# Patient Record
Sex: FEMALE | Race: WHITE | URBAN - METROPOLITAN AREA
[De-identification: names, ages, dates, MRNs, and addresses within clinical notes are randomized per-mention and may not be internally consistent; named-entity substitution may affect disease eponyms.]

---

## 2017-01-05 DIAGNOSIS — F34.1 DYSTHYMIA: Primary | ICD-10-CM

## 2017-01-05 RX ORDER — ESCITALOPRAM OXALATE 20 MG/1
TABLET ORAL
Qty: 30 TABLET | Refills: 0 | Status: CANCELLED | OUTPATIENT
Start: 2017-01-05

## 2017-01-05 NOTE — TELEPHONE ENCOUNTER
Escitalopram 20 mg tablets     Last Written Prescription Date: 12-  Last Fill Quantity: 12-, #30 refills: 0  Last Office Visit with [facility] primary care provider:  12- Aisha Kent.        Last PHQ-9 score on record=   PHQ-9 SCORE 12/9/2016   Total Score 13

## 2017-01-11 ENCOUNTER — VIRTUAL VISIT (OUTPATIENT)
Dept: FAMILY MEDICINE | Facility: CLINIC | Age: 57
End: 2017-01-11
Payer: COMMERCIAL

## 2017-01-11 DIAGNOSIS — F34.1 DYSTHYMIA: Primary | ICD-10-CM

## 2017-01-11 PROCEDURE — 98966 PH1 ASSMT&MGMT NQHP 5-10: CPT | Performed by: PHYSICIAN ASSISTANT

## 2017-01-11 RX ORDER — ESCITALOPRAM OXALATE 20 MG/1
20 TABLET ORAL DAILY
Qty: 90 TABLET | Refills: 1 | Status: SHIPPED | OUTPATIENT
Start: 2017-01-11

## 2017-01-11 NOTE — PROGRESS NOTES
"Yasmeen Muñiz is a 56 year old female who is being evaluated via a telephone visit.      The patient has been notified of following:     \"This telephone visit will be conducted via a call between you and your physician/provider. We have found that certain health care needs can be provided without the need for a physical exam.  This service lets us provide the care you need with a short phone conversation.  If a prescription is necessary we can send it directly to your pharmacy.  If lab work is needed we can place an order for that and you can then stop by our lab to have the test done at a later time.    We will bill your insurance company for this service.  Please check with your medical insurance if this type of visit is covered. You may be responsible for the cost of this type of visit if insurance coverage is denied.  The typical cost is $30 (10min), $59 (11-20min) and $85 (21-30min).  Most often these visits are shorter than 10 minutes.    If during the course of the call the physician/provider feels a telephone visit is not appropriate, you will not be charged for this service.\"       Consent has been obtained for this service by 2 care team members: yes. See the scanned image in the medical record.    Yasmeen Muñiz complains of  Recheck Medication      I have reviewed and updated the patient's Past Medical History, Social History, Family History and Medication List.    ALLERGIES  No known drug allergies    Jennifer Mattson MA   (MA signature)    Additional provider notes:       SUBJECTIVE:                                                    Yasmeen Muñiz is a 56 year old female who presents to clinic today for the following health issues:      Depression Followup    Status since last visit: Improved     See PHQ-9 for current symptoms.  Other associated symptoms: None    Complicating factors:   Significant life event:  No   Current substance abuse:  None  Anxiety or Panic symptoms:  Yes-    Patient states " "she still has range of emotions but does not feel \"the darkness\" as she previously did    PHQ-9  English PHQ-9   Any Language            Amount of exercise or physical activity: 4-5 days/week for an average of 45-60 minutes    Problems taking medications regularly: No    Medication side effects: none    Diet: regular (no restrictions)        Problem list and histories reviewed & adjusted, as indicated.  Additional history: as documented    Patient Active Problem List   Diagnosis     Allergic rhinitis     Essential hypertension, benign     Impaired fasting glucose     Mild intermittent asthma with exacerbation     CARDIOVASCULAR SCREENING; LDL GOAL LESS THAN 130     HTN, goal below 140/90     Non-seasonal allergic rhinitis     Habitual alcohol use     B12 deficiency     Anemia     Vitamin D deficiency disease     Esophageal reflux     Elevated LFTs     Pneumothorax on right     Health Care Home     MARLON (obstructive sleep apnea)     Advanced directives, counseling/discussion     Hyperlipidemia LDL goal <160     Dysthymia     Past Surgical History   Procedure Laterality Date     C nonspecific procedure       NVD     Hc removal of tonsils,<10 y/o       Hc dilation/curettage diag/ther non ob  age 31     Surgical history of -   10/2004     repair fx rt orbit floor/plate     Surgical history of - 11/2004     removal rt orbit (eye muscle was catching)       Social History   Substance Use Topics     Smoking status: Current Some Day Smoker -- 0.50 packs/day for 15 years     Types: Cigarettes     Smokeless tobacco: Never Used     Alcohol Use: No      Comment: 6 pack q week     Family History   Problem Relation Age of Onset     C.A.D. Father      angio at age 68.     Prostate Cancer Father      dx in his 40's     Allergies Sister      3 sisters with allergies     CANCER Maternal Grandmother      uterine     CANCER Paternal Grandfather      leukemia     Lipids Mother      Lipids Father      Thyroid Disease Mother      unknown " thyroid dx     C.A.D. Paternal Aunt      MI age mid 70's     Psychotic Disorder Daughter      bipolar     Family History Negative Daughter      Blood Disease Mother      pernicious anemia     CEREBROVASCULAR DISEASE Father      Alzheimer Disease Mother 57     Alzheimer Disease Maternal Uncle 60           ROS:  CONSTITUTIONAL:NEGATIVE for fever, chills, change in weight  PSYCHIATRIC: NEGATIVE for changes in mood or affect    OBJECTIVE:                                                    LMP 03/06/2012  There is no weight on file to calculate BMI.  Phone visit.   Voice is bright         ASSESSMENT/PLAN:                                                            1. Dysthymia  Stable. F/u in 6  Months.   - escitalopram (LEXAPRO) 20 MG tablet; Take 1 tablet (20 mg) by mouth daily  Dispense: 90 tablet; Refill: 1        Aisha Kent PA-C, PAScottC  [facility]    Assessment/Plan:  (F34.1) Dysthymia  (primary encounter diagnosis)  Comment:   Plan: escitalopram (LEXAPRO) 20 MG tablet                I have reviewed the note as documented above.  This accurately captures the substance of my conversation with the patient, Yasmeen Muñiz        Total time of call between patient and provider was   5 minutes

## 2017-01-18 ENCOUNTER — APPOINTMENT (OUTPATIENT)
Dept: GENERAL RADIOLOGY | Facility: CLINIC | Age: 57
End: 2017-01-18
Attending: EMERGENCY MEDICINE
Payer: COMMERCIAL

## 2017-01-18 ENCOUNTER — HOSPITAL ENCOUNTER (EMERGENCY)
Facility: CLINIC | Age: 57
Discharge: HOME | End: 2017-01-18
Attending: EMERGENCY MEDICINE | Admitting: EMERGENCY MEDICINE
Payer: COMMERCIAL

## 2017-01-18 VITALS
RESPIRATION RATE: 16 BRPM | DIASTOLIC BLOOD PRESSURE: 82 MMHG | SYSTOLIC BLOOD PRESSURE: 144 MMHG | TEMPERATURE: 97.9 F | BODY MASS INDEX: 20.19 KG/M2 | OXYGEN SATURATION: 96 % | HEIGHT: 70 IN | WEIGHT: 141 LBS

## 2017-01-18 DIAGNOSIS — S82.831A CLOSED FRACTURE OF DISTAL END OF RIGHT FIBULA, UNSPECIFIED FRACTURE MORPHOLOGY, INITIAL ENCOUNTER: ICD-10-CM

## 2017-01-18 PROCEDURE — 29515 APPLICATION SHORT LEG SPLINT: CPT | Mod: RT

## 2017-01-18 PROCEDURE — 25000132 ZZH RX MED GY IP 250 OP 250 PS 637: Performed by: EMERGENCY MEDICINE

## 2017-01-18 PROCEDURE — 73610 X-RAY EXAM OF ANKLE: CPT | Mod: RT

## 2017-01-18 PROCEDURE — 99284 EMERGENCY DEPT VISIT MOD MDM: CPT | Mod: 25

## 2017-01-18 RX ORDER — HYDROCODONE BITARTRATE AND ACETAMINOPHEN 5; 325 MG/1; MG/1
1 TABLET ORAL ONCE
Status: COMPLETED | OUTPATIENT
Start: 2017-01-18 | End: 2017-01-18

## 2017-01-18 RX ORDER — HYDROCODONE BITARTRATE AND ACETAMINOPHEN 5; 325 MG/1; MG/1
1-2 TABLET ORAL EVERY 4 HOURS PRN
Qty: 15 TABLET | Refills: 0 | Status: SHIPPED | OUTPATIENT
Start: 2017-01-18

## 2017-01-18 RX ADMIN — HYDROCODONE BITARTRATE AND ACETAMINOPHEN 1 TABLET: 5; 325 TABLET ORAL at 21:46

## 2017-01-18 ASSESSMENT — ENCOUNTER SYMPTOMS
ARTHRALGIAS: 1
NUMBNESS: 0
JOINT SWELLING: 1

## 2017-01-18 NOTE — ED AVS SNAPSHOT
Emergency Department    [address] [place] [address]    Phone:  [phone]    Fax:  [phone]                                       Yasmeen Muñiz   MRN: [id number]    Department:   Emergency Department   Date of Visit:  1/18/2017           After Visit Summary Signature Page     I have received my discharge instructions, and my questions have been answered. I have discussed any challenges I see with this plan with the nurse or doctor.    ..........................................................................................................................................  Patient/Patient Representative Signature      ..........................................................................................................................................  Patient Representative Print Name and Relationship to Patient    ..................................................               ................................................  Date                                            Time    ..........................................................................................................................................  Reviewed by Signature/Title    ...................................................              ..............................................  Date                                                            Time

## 2017-01-18 NOTE — ED AVS SNAPSHOT
Emergency Department    [address] [facility] [address]    Phone:  [phone]    Fax:  [phone]                                       Yasmeen Muñiz   MRN: [id number]    Department:   Emergency Department   Date of Visit:  1/18/2017           Patient Information     Date Of Birth          1960        Your diagnoses for this visit were:     Closed fracture of distal end of right fibula, unspecified fracture morphology, initial encounter        You were seen by TriggerAce MD.      Follow-up Information     [facility] In 3 days.    Contact information:    [address] [address]  [phone]          Follow up with  Emergency Department.    Specialty:  EMERGENCY MEDICINE    Why:  As needed, If symptoms worsen    Contact information:    [address] [facility] [id number] [phone]        Discharge Instructions         Ankle Fracture, Distal Fibula  You have a fracture, or broken bone, of the end of the fibula bone. The fibula is one of two bones that support the ankle joint.    Home care    You will be given a splint, cast, or special boot to prevent movement at the injury site. Do not put weight on a splint. It will break. Follow your healthcare provider s advice about when to begin bearing weight on a cast or boot.    Keep your leg elevated when sitting or lying down. When sleeping, place a pillow under the injured leg. When sitting, support the injured leg so it is level with your waist. This is very important during the first 48 hours.    Keep the cast or splint completely dry at all times. When bathing, protect the cast or splint with 2 large plastic bags. Place 1 bag outside of the other. Tape each bag with duct tape at the top end. Water can still leak in even when the foot is covered. So it's best to keep the cast, splint, or boot away from water. If a fiberglass cast or splint gets wet, dry it  with a hair dryer on a cool setting.    Place an ice pack over the injured area for no more than 15 to 20 minutes. Do this every 3 to 6 hours for the first 24 to 48 hours. Continue this 3 to 4 times a day as needed. To make an ice pack, put ice cubes in a plastic bag that seals at the top. Wrap the bag in a clean, thin towel or cloth. Never put ice or an ice pack directly on the skin. The ice pack can be put right on the cast or splint. As the ice melts, be careful that the cast or splint doesn t get wet.    You may use over-the-counter pain medicine to control pain, unless another pain medicine was prescribed. If you have chronic liver or kidney disease or ever had a stomach ulcer or GI bleeding, talk with your provider before using these medicines.  Follow-up care  Follow up with your healthcare provider in 1 week, or as advised. This is to be sure the bone is healing properly. If you were given a splint, it may be changed to a cast after the swelling goes down.  If X-rays were taken, you will be told of any new findings that may affect your care.  When to seek medical advice  Call your healthcare provider right away if any of these occur:    The plaster cast or splint becomes wet or soft    The fiberglass cast or splint stays wet for more than 24 hours    There is increased tightness or pain under the cast or splint    Your toes become swollen, cold, blue, numb, or tingly    The cast becomes loose    The cast has a bad smell    Sore areas develop under the cast    The cast develops cracks or breaks     7320-6588 [employer]. [address] [place] [address], [place] [address]. All rights reserved. This information is not intended as a substitute for professional medical care. Always follow your healthcare professional's instructions.      Opioid Medication Information    You have been given a prescription for an opioid (narcotic) pain medicine and/or have received a pain medicine while here in the Emergency  Department. These medicines can make you drowsy or impaired. You must not drive, operate dangerous equipment, or engage in any other dangerous activities while taking these medications. If you drive while taking these medications, you could be arrested for DUI, or driving under the influence. Do not drink any alcohol while you are taking these medications.   Opioid pain medications can cause addiction. If you have a history of chemical dependency of any type, you are at a higher risk of becoming addicted to pain medications.  Only take these prescribed medications to treat your pain when all other options have been tried. Take it for as short a time and as few doses as possible. Store your pain pills in a secure place, as they are frequently stolen and provide a dangerous opportunity for children or visitors in your house to start abusing these powerful medications. We will not replace any lost or stolen medicine.  As soon as your pain is better, you should flush all your remaining medication.   Many prescription pain medications contain Tylenol  (acetaminophen), including Vicodin , Tylenol #3 , Norco , Lortab , and Percocet .  You should not take any extra pills of Tylenol  if you are using these prescription medications or you can get very sick.  Do not ever take more than 4000 mg of acetaminophen in any 24 hour period.  All opioids tend to cause constipation. Drink plenty of water and eat foods that have a lot of fiber, such as fruits, vegetables, prune juice, apple juice and high fiber cereal.  Take a laxative if you don t move your bowels at least every other day. Miralax , Milk of Magnesia, Colace , or Senna  can be used to keep you regular.          24 Hour Appointment Hotline       To make an appointment at any [facility], call [phone] ([phone]). If you don't have a family doctor or clinic, we will help you find one. [facility] are conveniently located to serve the needs of you and  your family.             Review of your medicines      START taking        Dose / Directions Last dose taken    HYDROcodone-acetaminophen 5-325 MG per tablet   Commonly known as:  NORCO   Dose:  1-2 tablet   Quantity:  15 tablet        Take 1-2 tablets by mouth every 4 hours as needed for moderate to severe pain   Refills:  0          Our records show that you are taking the medicines listed below. If these are incorrect, please call your family doctor or clinic.        Dose / Directions Last dose taken    acetaminophen 325 MG tablet   Commonly known as:  TYLENOL   Dose:  325 mg   Quantity:  100 tablet        Take 1 tablet (325 mg) by mouth every 6 hours as needed for mild pain or fever   Refills:  0        albuterol 108 (90 BASE) MCG/ACT Inhaler   Commonly known as:  PROAIR HFA/PROVENTIL HFA/VENTOLIN HFA   Dose:  2 puff   Quantity:  2 Inhaler        Inhale 2 puffs into the lungs every 6 hours as needed for shortness of breath / dyspnea   Refills:  6        cetirizine 10 MG tablet   Commonly known as:  zyrTEC   Dose:  10 mg   Quantity:  30 tablet        Take 1 tablet by mouth every evening.   Refills:  1        * cyanocobalamin 1000 MCG/ML injection   Commonly known as:  VITAMIN B12   Dose:  1 mL   Quantity:  1 mL        Inject 1 mL (1,000 mcg) into the muscle every 30 days   Refills:  11        * cyanocobalamin 1000 MCG/ML injection   Commonly known as:  VITAMIN B12   Dose:  1 mL   Quantity:  1 mL        Inject 1 mL (1,000 mcg) into the muscle every 30 days   Refills:  12        * escitalopram 20 MG tablet   Commonly known as:  LEXAPRO   Quantity:  30 tablet        Take 1/2 tablet (10 mg) for 1-2 weeks, then increase to 1 tablet orally daily   Refills:  0        * escitalopram 20 MG tablet   Commonly known as:  LEXAPRO   Dose:  20 mg   Quantity:  90 tablet        Take 1 tablet (20 mg) by mouth daily   Refills:  1        ibuprofen 600 MG tablet   Commonly known as:  ADVIL/MOTRIN   Dose:  600 mg   Quantity:  60  tablet        Take 1 tablet (600 mg) by mouth every 6 hours as needed for other (inflammatory pain)   Refills:  0        lisinopril 10 MG tablet   Commonly known as:  PRINIVIL/ZESTRIL   Dose:  10 mg   Quantity:  90 tablet        Take 1 tablet (10 mg) by mouth daily   Refills:  3        traMADol 50 MG tablet   Commonly known as:  ULTRAM   Dose:   mg   Quantity:  20 tablet        Take 1-2 tablets ( mg) by mouth nightly as needed for pain   Refills:  0        * Notice:  This list has 4 medication(s) that are the same as other medications prescribed for you. Read the directions carefully, and ask your doctor or other care provider to review them with you.            Prescriptions were sent or printed at these locations (1 Prescription)                   Other Prescriptions                Printed at Department/Unit printer (1 of 1)         HYDROcodone-acetaminophen (NORCO) 5-325 MG per tablet                Procedures and tests performed during your visit     Ankle XR, G/E 3 views, right      Orders Needing Specimen Collection     None      Pending Results     No orders found from 1/17/2017 to 1/19/2017.            Pending Culture Results     No orders found from 1/17/2017 to 1/19/2017.       Test Results from your hospital stay           1/18/2017  8:58 PM - Interface, Radiant Ib      Narrative     XR ANKLE RT G/E 3 VW 1/18/2017 8:54 PM    HISTORY: Trauma.    COMPARISON: None.        Impression     IMPRESSION: Mildly displaced fracture of the distal tip of the right  lateral malleolus, with overlying soft tissue swelling.    QUANG OVALLE MD                Clinical Quality Measure: Blood Pressure Screening     Your blood pressure was checked while you were in the emergency department today. The last reading we obtained was  BP: 152/85 mmHg . Please read the guidelines below about what these numbers mean and what you should do about them.  If your systolic blood pressure (the top number) is less than 120 and  your diastolic blood pressure (the bottom number) is less than 80, then your blood pressure is normal. There is nothing more that you need to do about it.  If your systolic blood pressure (the top number) is 120-139 or your diastolic blood pressure (the bottom number) is 80-89, your blood pressure may be higher than it should be. You should have your blood pressure rechecked within a year by a primary care provider.  If your systolic blood pressure (the top number) is 140 or greater or your diastolic blood pressure (the bottom number) is 90 or greater, you may have high blood pressure. High blood pressure is treatable, but if left untreated over time it can put you at risk for heart attack, stroke, or kidney failure. You should have your blood pressure rechecked by a primary care provider within the next 4 weeks.  If your provider in the emergency department today gave you specific instructions to follow-up with your doctor or provider even sooner than that, you should follow that instruction and not wait for up to 4 weeks for your follow-up visit.        Thank you for choosing [facility]       Thank you for choosing [facility] for your care. Our goal is always to provide you with excellent care. Hearing back from our patients is one way we can continue to improve our services. Please take a few minutes to complete the written survey that you may receive in the mail after you visit with us. Thank you!        [employer] Information     [employer] gives you secure access to your electronic health record. If you see a primary care provider, you can also send messages to your care team and make appointments. If you have questions, please call your primary care clinic.  If you do not have a primary care provider, please call [phone] and they will assist you.        Care EveryWhere ID     This is your Care EveryWhere ID. This could be used by other organizations to access your [facility] medical records  [id number]         After Visit Summary       This is your record. Keep this with you and show to your community pharmacist(s) and doctor(s) at your next visit.

## 2017-01-18 NOTE — LETTER
EMERGENCY DEPARTMENT  [address] [facility] [address]  [phone]    2017    Yasmeen Muñiz  [address]   [place] [address]  [phone] (home)     : 1960      To Whom it may concern:    Yasmeen Muñiz was seen in our Emergency Department today, 2017.  I expect her condition to improve over the next 3 days.  She may return to work/school when improved.    Sincerely,        Ace Malagon Trigger

## 2017-01-19 NOTE — ED PROVIDER NOTES
"  History     Chief Complaint:  Traumatic ankle pain    HPI   Yasmeen Muñiz is a 56 year old female who presents with right ankle pain after slipping and inverting her foot. She complains of right lateral ankle pain and swelling. She denies any numbness, head trauma, LOC or other injuries.    Allergies:  NKDA     Medications:     Lisinopril  Albuterol  Zyrtec    Past Medical History:     Allergic rhinitis    Asthma    Sleep apnea    GERD  Vitamin D deficiency disease  B12 deficiency    HTN  Anemia    Habitual alcohol use      Past Surgical History:     Tonsillectomy    D & C  Repair fracture right orbit floor/plate x2    Social History:  Marital status:   Tobacco use: current - 0.50 packs/day    Alcohol use: positive - 6 pack/week  Patient presents to the ED alone.    Review of Systems   Musculoskeletal: Positive for joint swelling and arthralgias.   Neurological: Negative for numbness.   All other systems reviewed and are negative.        Physical Exam   First Vitals:  BP: 152/85 mmHg  Heart Rate: 86  Temp: 97.9  F (36.6  C)  Resp: 18  Height: 177.8 cm (5' 10\")  Weight: 63.957 kg (141 lb)  SpO2: 95 %      Physical Exam  General: Alert, interactive in mild distress  Head:  Scalp is atraumatic  Eyes:  The pupils are equal, round, and reactive to light    EOM's intact    No scleral icterus  ENT:      Nose:  The external nose is normal  Ears:  External ears are normal  Mouth/Throat: The oropharynx is normal    Mucus membranes are moist      Neck:  Normal range of motion.      There is no rigidity.    Trachea is in the midline         CV:  Regular rate and rhythm    No murmur   Resp:  Breath sounds are clear bilaterally    Non-labored, no retractions or accessory muscle use     MS:  Right lower extremity: swelling and ecchymosis over lateral malleolus. No tenderness over fibular head. Abrasion of right knee. 2+   DP pulse on the right.   Skin:  Warm and dry, No rash or lesions noted.  Neuro: Strength 5/5 x4.  " Sensation intact  In all 4 extremities.      GCS: 15  Psych:  Awake. Alert.  Normal affect.      Appropriate interactions.      Emergency Department Course     Imaging:  Radiology findings were communicated with the patient who voiced understanding of the findings.    Right Ankle XR, per radiology:  Mildly displaced fracture of the distal tip of the right lateral malleolus, with overlying soft tissue swelling.    Procedures:  PROCEDURE: Splint Placement.    A plaster short leg with stirrup splint was applied to the right ankle and after placement I checked and adjusted the fit to ensure proper positioning.  The patient was more comfortable with the splint in place.  Sensation and circulation are intact after splint placement.    Emergency Department Course:  Nursing notes and vitals reviewed.  I performed an exam of the patient as documented above.   The patient was placed on continuous pulse oximetry and cardiac monitoring.  The patient was sent for a right ankle X-ray while in the emergency department, findings above.   At 2110 the patient was rechecked and was updated on the results of her imaging studies.     I discussed the treatment plan with the patient. They expressed understanding of this plan and consented to discharge. They will be discharged home with instructions for care and follow up. In addition, the patient will return to the emergency department if their symptoms persist, worsen, if new symptoms arise or if there is any concern.  All questions were answered.  I personally reviewed the findings with the patient and answered all related questions prior to discharge.    Impression & Plan      Medical Decision Making:  Ms. gonzalez was seen and evaluated. Radiographs demonstrate right distal fibular fracture. A splint was placed and she was provided with norco and crutches. She will follow-up with [facility] and return if new symptoms develop. There's no signs of Ma[place]ouve fracture,  compartment syndrome, neurovascular compromise or other more concerning illness.      Diagnosis:    ICD-10-CM    1. Closed fracture of distal end of right fibula, unspecified fracture morphology, initial encounter S82.831A        Disposition:   Discharge home    Discharge Medications:  Discharge Medication List as of 1/18/2017 10:09 PM      START taking these medications    Details   HYDROcodone-acetaminophen (NORCO) 5-325 MG per tablet Take 1-2 tablets by mouth every 4 hours as needed for moderate to severe pain, Disp-15 tablet, R-0, Local Print             Scribe Disclosure:  I, Marine Rivera, am serving as a scribe at 9:05 PM on 1/18/2017 to document services personally performed by Ace Pedroza MD, based on my observations and the provider's statements to me.        Ace Pedroza MD  01/19/17 0030

## 2017-01-19 NOTE — DISCHARGE INSTRUCTIONS
Ankle Fracture, Distal Fibula  You have a fracture, or broken bone, of the end of the fibula bone. The fibula is one of two bones that support the ankle joint.    Home care    You will be given a splint, cast, or special boot to prevent movement at the injury site. Do not put weight on a splint. It will break. Follow your healthcare provider s advice about when to begin bearing weight on a cast or boot.    Keep your leg elevated when sitting or lying down. When sleeping, place a pillow under the injured leg. When sitting, support the injured leg so it is level with your waist. This is very important during the first 48 hours.    Keep the cast or splint completely dry at all times. When bathing, protect the cast or splint with 2 large plastic bags. Place 1 bag outside of the other. Tape each bag with duct tape at the top end. Water can still leak in even when the foot is covered. So it's best to keep the cast, splint, or boot away from water. If a fiberglass cast or splint gets wet, dry it with a hair dryer on a cool setting.    Place an ice pack over the injured area for no more than 15 to 20 minutes. Do this every 3 to 6 hours for the first 24 to 48 hours. Continue this 3 to 4 times a day as needed. To make an ice pack, put ice cubes in a plastic bag that seals at the top. Wrap the bag in a clean, thin towel or cloth. Never put ice or an ice pack directly on the skin. The ice pack can be put right on the cast or splint. As the ice melts, be careful that the cast or splint doesn t get wet.    You may use over-the-counter pain medicine to control pain, unless another pain medicine was prescribed. If you have chronic liver or kidney disease or ever had a stomach ulcer or GI bleeding, talk with your provider before using these medicines.  Follow-up care  Follow up with your healthcare provider in 1 week, or as advised. This is to be sure the bone is healing properly. If you were given a splint, it may be changed to a  cast after the swelling goes down.  If X-rays were taken, you will be told of any new findings that may affect your care.  When to seek medical advice  Call your healthcare provider right away if any of these occur:    The plaster cast or splint becomes wet or soft    The fiberglass cast or splint stays wet for more than 24 hours    There is increased tightness or pain under the cast or splint    Your toes become swollen, cold, blue, numb, or tingly    The cast becomes loose    The cast has a bad smell    Sore areas develop under the cast    The cast develops cracks or breaks     1816-4837 [employer]. [address] [place] [address]. All rights reserved. This information is not intended as a substitute for professional medical care. Always follow your healthcare professional's instructions.      Opioid Medication Information    You have been given a prescription for an opioid (narcotic) pain medicine and/or have received a pain medicine while here in the Emergency Department. These medicines can make you drowsy or impaired. You must not drive, operate dangerous equipment, or engage in any other dangerous activities while taking these medications. If you drive while taking these medications, you could be arrested for DUI, or driving under the influence. Do not drink any alcohol while you are taking these medications.   Opioid pain medications can cause addiction. If you have a history of chemical dependency of any type, you are at a higher risk of becoming addicted to pain medications.  Only take these prescribed medications to treat your pain when all other options have been tried. Take it for as short a time and as few doses as possible. Store your pain pills in a secure place, as they are frequently stolen and provide a dangerous opportunity for children or visitors in your house to start abusing these powerful medications. We will not replace any lost or stolen medicine.  As soon as your  pain is better, you should flush all your remaining medication.   Many prescription pain medications contain Tylenol  (acetaminophen), including Vicodin , Tylenol #3 , Norco , Lortab , and Percocet .  You should not take any extra pills of Tylenol  if you are using these prescription medications or you can get very sick.  Do not ever take more than 4000 mg of acetaminophen in any 24 hour period.  All opioids tend to cause constipation. Drink plenty of water and eat foods that have a lot of fiber, such as fruits, vegetables, prune juice, apple juice and high fiber cereal.  Take a laxative if you don t move your bowels at least every other day. Miralax , Milk of Magnesia, Colace , or Senna  can be used to keep you regular.

## 2017-01-23 ENCOUNTER — TRANSFERRED RECORDS (OUTPATIENT)
Dept: HEALTH INFORMATION MANAGEMENT | Facility: CLINIC | Age: 57
End: 2017-01-23

## 2017-02-01 ENCOUNTER — TELEPHONE (OUTPATIENT)
Dept: FAMILY MEDICINE | Facility: CLINIC | Age: 57
End: 2017-02-01

## 2017-02-01 NOTE — Clinical Note
[facility]  [address] [place] [id number]  [phone]  February 8, 2017    Yasmeen Muñiz  [address] [address]    Dear Yasmeen,    I care about your health and have reviewed your health plan. I have reviewed your medical conditions, medication list, and lab results and am making recommendations based on this review, to better manage your health.    You are in particular need of attention regarding:  -Breast Cancer Screening  -Colon Cancer Screening    I am recommending that you:  {recommendations:-schedule a MAMMOGRAM which is due. We have mammogram available at our clinic; Tuesday 8:00am-11:30am or Wednesday 2:00pm-4:15pm- to schedule call [phone].   Please disregard this reminder if you have had this exam elsewhere within the last year.  It would be helpful for us to have a copy of your mammogram report in our file so that we can best coordinate your care.  -schedule a COLONOSCOPY to look for colon cancer (due every 10 years or 5 years in higher risk situations.)   Colon cancer is now the second leading cause of death in the [place] for both men and women and there are over 130,000 new cases and 50,000 deaths per year from colon cancer.  Colonoscopies can prevent 90-95% of these deaths.  Problem lesions can be removed before they ever become cancer.  This test is not only looking for cancer, but also getting rid of precancerious lesions.  If you do not wish to do a colonoscopy or cannot afford to do one, at this time, there is another option. It is called a FIT test or Fecal Immunochemical Occult Blood Test (take home stool sample kit).  It does not replace the colonoscopy for colorectal cancer screening, but it can detect hidden bleeding in the lower colon.  It does need to be repeated every year and if a positive result is obtained, you would be referred for a colonoscopy.  If you have completed either one of  these tests at another facility, please have the records sent to our clinic so that we can best coordinate your care.    Here is a list of Health Maintenance topics that are due now or due soon:  Health Maintenance Due   Topic Date Due     FIT Q1 YR (NO INBASKET)  10/21/1969     MAMMO SCREEN Q2 YR (SYSTEM ASSIGNED)  12/04/2009     ASTHMA CONTROL TEST Q6 MOS (NO INBASKET)  10/22/2016       Please call us at [phone] (or use [employer]) to address the above recommendations.     Thank you for trusting [facility] and we appreciate the opportunity to serve you.  We look forward to supporting your healthcare needs in the future.    Healthy Regards,    Aisha Kent PA-C

## 2017-02-01 NOTE — TELEPHONE ENCOUNTER
Panel Management Review      Patient has the following on her problem list:     Asthma review     ACT Total Scores 4/22/2016   ACT TOTAL SCORE -   ASTHMA ER VISITS -   ASTHMA HOSPITALIZATIONS -   ACT TOTAL SCORE (Goal Greater than or Equal to 20) 15   In the past 12 months, how many times did you visit the emergency room for your asthma without being admitted to the hospital? 0   In the past 12 months, how many times were you hospitalized overnight because of your asthma? 0      1. Is Asthma diagnosis on the Problem List? Yes    2. Is Asthma listed on Health Maintenance? Yes    3. Patient is due for:  ACT      Composite cancer screening  Chart review shows that this patient is due/due soon for the following Mammogram and Fecal Colorectal (FIT)  Summary:    Patient is due/failing the following:   ACT, FIT and MAMMOGRAM    Action needed:   Patient needs referral/order: Mammo  and  fit test at lab    Type of outreach:    Phone, left message for patient to call back.     Questions for provider review:    None                                                                                                                                    Jennifer Mattson MA       Chart routed to Care Team .

## 2017-02-27 ENCOUNTER — TELEPHONE (OUTPATIENT)
Dept: FAMILY MEDICINE | Facility: CLINIC | Age: 57
End: 2017-02-27

## 2017-02-27 DIAGNOSIS — M25.512 ACUTE PAIN OF LEFT SHOULDER: ICD-10-CM

## 2017-02-27 RX ORDER — TRAMADOL HYDROCHLORIDE 50 MG/1
50-100 TABLET ORAL
Qty: 20 TABLET | Refills: 0 | Status: CANCELLED | OUTPATIENT
Start: 2017-02-27

## 2017-02-27 NOTE — TELEPHONE ENCOUNTER
Controlled Substance Refill Request for Tramadol 50mg  Problem List Complete:  No     PROVIDER TO CONSIDER COMPLETION OF PROBLEM LIST AND OVERVIEW/CONTROLLED SUBSTANCE AGREEMENT    Last Written Prescription Date:  12/9/2016  Last Fill Quantity: 20,   # refills: 0    Last Office Visit with [facility] primary care provider: 12/9/2016-Aisha Kent    Future Office visit:     Controlled substance agreement on file: No.     Processing:  Fax Rx to [facility] pharmacy   checked in past 6 months?  No, route to RN

## 2017-02-28 NOTE — TELEPHONE ENCOUNTER
Denied tramadol refill request.   Why is pt needing this? I see she just had a fracture.    I believe she was seeing ortho, if Rx if due to that pain, may want to contact them    Aisha Kent PA-C

## 2017-03-01 NOTE — TELEPHONE ENCOUNTER
"Pt states she takes for pain in neck.  Informed Rx was not approved. Replied, \"Whatever.\"   Zaid Sanchez RN    "

## 2017-03-06 DIAGNOSIS — M25.512 ACUTE PAIN OF LEFT SHOULDER: ICD-10-CM

## 2017-03-06 RX ORDER — TRAMADOL HYDROCHLORIDE 50 MG/1
50-100 TABLET ORAL
Qty: 20 TABLET | Refills: 0 | Status: CANCELLED | OUTPATIENT
Start: 2017-03-06

## 2017-04-28 DIAGNOSIS — I10 HYPERTENSION GOAL BP (BLOOD PRESSURE) < 140/90: ICD-10-CM

## 2017-04-28 NOTE — LETTER
[address] [address]  Phone: [phone]    04/29/17    Yasmeen Muñiz  [address] [address]      Dear Yasmeen:     We recently received a call from your pharmacy requesting a refill of your medication.    A review of your chart indicates that an appointment is for lab work. Please call the clinic at [phone] to schedule your appointment.    We have authorized one refill of your medication to allow time for you to schedule your appointment.    Taking care of your health is important to us, and ongoing visits with your provider are vital to your care.  We look forward to seeing you in the near future.          Sincerely,      Aisha Kent PA-C, Kary CLEANING RN

## 2017-04-28 NOTE — TELEPHONE ENCOUNTER
lisinopril (PRINIVIL,ZESTRIL) 10 MG tablet      Last Written Prescription Date: 4/22/16  Last Fill Quantity: 90, # refills: 3  Last Office Visit with [facility], [facility] or [facility] prescribing provider: Donte 12/09/16       Potassium   Date Value Ref Range Status   04/22/2016 4.4 3.4 - 5.3 mmol/L Final     Creatinine   Date Value Ref Range Status   04/22/2016 0.54 0.52 - 1.04 mg/dL Final     BP Readings from Last 3 Encounters:   01/18/17 144/82   12/09/16 131/84   04/22/16 121/77

## 2017-04-29 RX ORDER — LISINOPRIL 10 MG/1
10 TABLET ORAL DAILY
Qty: 90 TABLET | Refills: 0 | Status: SHIPPED | OUTPATIENT
Start: 2017-04-29 | End: 2017-08-01

## 2017-04-29 NOTE — TELEPHONE ENCOUNTER
Medication is being filled for 1 time refill only due to:  Future labs ordered Letter sent to schedule labs.     Kary Brandt RN, BS  Clinical Nurse Triage.

## 2017-04-30 DIAGNOSIS — J45.21 MILD INTERMITTENT ASTHMA WITH EXACERBATION: ICD-10-CM

## 2017-04-30 NOTE — TELEPHONE ENCOUNTER
Pending Prescriptions:                       Disp   Refills    ALBUTEROL 108 (90 BASE) MCG/ACT inhaler [*25.5 g 0            Sig: INHALE 2 PUFFS BY MOUTH EVERY 6 HOURS AS NEEDED           FOR SHORTNESS OF BREATH OR DIFFICULT BREATHING             Last Written Prescription Date: 4/22/2016  Last Fill Quantity: 2 inhaler, # refills: 6    Last Office Visit with [facility], [facility] or [facility] prescribing provider:  12/9/2016Donte   Future Office Visit:       Date of Last Asthma Action Plan Letter:   Asthma Action Plan Q1 Year    Topic Date Due     Asthma Action Plan - yearly  04/22/2017      Asthma Control Test:   ACT Total Scores 4/22/2016   ACT TOTAL SCORE -   ASTHMA ER VISITS -   ASTHMA HOSPITALIZATIONS -   ACT TOTAL SCORE (Goal Greater than or Equal to 20) 15   In the past 12 months, how many times did you visit the emergency room for your asthma without being admitted to the hospital? 0   In the past 12 months, how many times were you hospitalized overnight because of your asthma? 0       Date of Last Spirometry Test:   No results found for this or any previous visit.

## 2017-05-02 RX ORDER — ALBUTEROL SULFATE 90 UG/1
AEROSOL, METERED RESPIRATORY (INHALATION)
Qty: 25.5 G | Refills: 0 | Status: SHIPPED | OUTPATIENT
Start: 2017-05-02 | End: 2017-06-26

## 2017-05-02 NOTE — TELEPHONE ENCOUNTER
Prescription approved per [facility] Refill [facility] x 1.   [employer] message sent due for asthma follow up .  Last ACT 4/22/16  Zaid Sanchez RN

## 2017-05-03 ENCOUNTER — TELEPHONE (OUTPATIENT)
Dept: FAMILY MEDICINE | Facility: CLINIC | Age: 57
End: 2017-05-03

## 2017-05-03 NOTE — TELEPHONE ENCOUNTER
5/3/2017    Call Regarding Preventive Health Screening Colonoscopy, Mammogram and Cervical/PAP    Attempt 1    Message on voicemail     Comments:       Outreach   cnt

## 2017-06-26 DIAGNOSIS — J45.21 MILD INTERMITTENT ASTHMA WITH EXACERBATION: ICD-10-CM

## 2017-06-26 NOTE — TELEPHONE ENCOUNTER
Pending Prescriptions:                       Disp   Refills    albuterol (ALBUTEROL) 108 (90 BASE) MCG/A*25.5 g 0          FYI-patient know she is due for a appointment but can't get in until August, can you send enough medication until then?  Inés Bueno     Albuterol       Last Written Prescription Date: 05/02/17  Last Fill Quantity: 1, # refills: 0    Last Office Visit with [facility] or [facility] prescribing provider:  01/11/2017  Future Office Visit:       Date of Last Asthma Action Plan Letter:   Asthma Action Plan Q1 Year    Topic Date Due     Asthma Action Plan - yearly  04/22/2017      Asthma Control Test:   ACT Total Scores 4/22/2016   ACT TOTAL SCORE -   ASTHMA ER VISITS -   ASTHMA HOSPITALIZATIONS -   ACT TOTAL SCORE (Goal Greater than or Equal to 20) 15   In the past 12 months, how many times did you visit the emergency room for your asthma without being admitted to the hospital? 0   In the past 12 months, how many times were you hospitalized overnight because of your asthma? 0       Date of Last Spirometry Test:   No results found for this or any previous visit.    Patient can be reach at [phone].  Inés Bueno

## 2017-06-27 RX ORDER — ALBUTEROL SULFATE 90 UG/1
AEROSOL, METERED RESPIRATORY (INHALATION)
Qty: 25.5 G | Refills: 0 | Status: SHIPPED | OUTPATIENT
Start: 2017-06-27

## 2017-07-20 NOTE — TELEPHONE ENCOUNTER
7/20/2017    Attempt 2    Contacted patient in regards to scheduling VIP mammogram  Message on voicemail     Patient is also due for - Preventive Health Screening Colonoscopy    Comments: will call on own time      Outreach   Stacia Soria

## 2017-08-31 ENCOUNTER — TELEPHONE (OUTPATIENT)
Dept: FAMILY MEDICINE | Facility: CLINIC | Age: 57
End: 2017-08-31

## 2017-08-31 NOTE — TELEPHONE ENCOUNTER
Panel Management Review      Patient has the following on her problem list:     Asthma review     ACT Total Scores 4/22/2016   ACT TOTAL SCORE -   ASTHMA ER VISITS -   ASTHMA HOSPITALIZATIONS -   ACT TOTAL SCORE (Goal Greater than or Equal to 20) 15   In the past 12 months, how many times did you visit the emergency room for your asthma without being admitted to the hospital? 0   In the past 12 months, how many times were you hospitalized overnight because of your asthma? 0      1. Is Asthma diagnosis on the Problem List? Yes    2. Is Asthma listed on Health Maintenance? Yes    3. Patient is due for:  ACT        Composite cancer screening  Chart review shows that this patient is due/due soon for the following Mammogram and Fecal Colorectal (FIT)  Summary:    Patient is due/failing the following:   ACT, FIT and MAMMOGRAM    Action needed:   Patient needs to do ACT. and Patient needs referral/order: Fit and Mammo    Type of outreach:    Sent letter. and Copy of ACT mailed to patient, will reach out in 5 days.    Questions for provider review:    None                                                                                                                                    Jennifer Mattson MA       Chart routed to no one .

## 2017-08-31 NOTE — LETTER
[facility]  [address] [place] [id number]  [phone]  August 31, 2017    Yasmeen Muñiz  [address] [address]    Dear Yasmeen,    I care about your health and have reviewed your health plan. I have reviewed your medical conditions, medication list, and lab results and am making recommendations based on this review, to better manage your health.    You are in particular need of attention regarding:  -Asthma  -Breast Cancer Screening  -Colon Cancer Screening    I am recommending that you:  {recommendations:-schedule a MAMMOGRAM which is due. We have mammogram available at our clinic; Tuesday 8:00am-11:30am or Wednesday 2:00pm-4:15pm- to schedule call [phone].   Please disregard this reminder if you have had this exam elsewhere within the last year.  It would be helpful for us to have a copy of your mammogram report in our file so that we can best coordinate your care.  -schedule a COLONOSCOPY to look for colon cancer (due every 10 years or 5 years in higher risk situations.)   Colon cancer is now the second leading cause of death in the [place] for both men and women and there are over 130,000 new cases and 50,000 deaths per year from colon cancer.  Colonoscopies can prevent 90-95% of these deaths.  Problem lesions can be removed before they ever become cancer.  This test is not only looking for cancer, but also getting rid of precancerious lesions.  If you do not wish to do a colonoscopy or cannot afford to do one, at this time, there is another option. It is called a FIT test or Fecal Immunochemical Occult Blood Test (take home stool sample kit).  It does not replace the colonoscopy for colorectal cancer screening, but it can detect hidden bleeding in the lower colon.  It does need to be repeated every year and if a positive result is obtained, you would be referred for a colonoscopy.  If you have completed either one  of these tests at another facility, please have the records sent to our clinic so that we can best coordinate your care.   -Complete and return the attached ASTHMA CONTROL TEST.  If your total score is 19 or less or you have been to the ER or urgent care for your asthma, then please schedule an asthma followup appointment.    Here is a list of Health Maintenance topics that are due now or due soon:  Health Maintenance Due   Topic Date Due     FIT Q1 YR  10/21/1969     DEPRESSION ACTION PLAN Q1 YR  10/21/1977     MAMMO SCREEN Q2 YR (SYSTEM ASSIGNED)  12/04/2009     ASTHMA CONTROL TEST Q6 MOS  10/22/2016     ASTHMA ACTION PLAN Q1 YR  04/22/2017     PHQ-9 Q6 MONTHS  06/09/2017       Please call us at [phone] (or use [employer]) to address the above recommendations.     Thank you for trusting [facility] and we appreciate the opportunity to serve you.  We look forward to supporting your healthcare needs in the future.    Healthy Regards,    Aisha Kent PA-C

## 2017-09-01 DIAGNOSIS — I10 HYPERTENSION GOAL BP (BLOOD PRESSURE) < 140/90: ICD-10-CM

## 2017-09-01 NOTE — TELEPHONE ENCOUNTER
Lisinopril      Last Written Prescription Date: 08/03/17  Last Fill Quantity: 30, # refills: 0  Last Office Visit with [facility] or [facility] prescribing provider: 12/09/16 Jaime       Potassium   Date Value Ref Range Status   04/22/2016 4.4 3.4 - 5.3 mmol/L Final     Creatinine   Date Value Ref Range Status   04/22/2016 0.54 0.52 - 1.04 mg/dL Final     BP Readings from Last 3 Encounters:   01/18/17 144/82   12/09/16 131/84   04/22/16 121/77

## 2017-09-05 RX ORDER — LISINOPRIL 10 MG/1
10 TABLET ORAL DAILY
Qty: 14 TABLET | Refills: 0 | Status: SHIPPED | OUTPATIENT
Start: 2017-09-05 | End: 2017-09-16

## 2017-09-05 NOTE — TELEPHONE ENCOUNTER
Routing refill request to provider for review/approval because:  Jenn given x2 and patient did not follow up, please advise     Left message on voicemail for pt to call back. Your pharmacy sent us a refill request and we need to talk with you to process it.   Aisha, could we send in a week or ? Refill?  Zaid Sanchez RN

## 2017-09-16 ENCOUNTER — TELEPHONE (OUTPATIENT)
Dept: FAMILY MEDICINE | Facility: CLINIC | Age: 57
End: 2017-09-16

## 2017-09-16 DIAGNOSIS — I10 HYPERTENSION GOAL BP (BLOOD PRESSURE) < 140/90: ICD-10-CM

## 2017-09-16 RX ORDER — LISINOPRIL 10 MG/1
TABLET ORAL
Qty: 14 TABLET | Refills: 0 | Status: SHIPPED | OUTPATIENT
Start: 2017-09-16

## 2017-09-28 DIAGNOSIS — I10 HYPERTENSION GOAL BP (BLOOD PRESSURE) < 140/90: ICD-10-CM

## 2017-09-28 NOTE — TELEPHONE ENCOUNTER
LISINOPRIL 10MG TABLETS        Last Written Prescription Date: 09/16/17  Last Fill Quantity: 14, # refills: 0  Last Office Visit with [facility], [facility] or [facility] prescribing provider: 12/09/16 Aisha Kent       Potassium   Date Value Ref Range Status   04/22/2016 4.4 3.4 - 5.3 mmol/L Final     Creatinine   Date Value Ref Range Status   04/22/2016 0.54 0.52 - 1.04 mg/dL Final     BP Readings from Last 3 Encounters:   01/18/17 144/82   12/09/16 131/84   04/22/16 121/77

## 2017-09-29 RX ORDER — LISINOPRIL 10 MG/1
TABLET ORAL
Start: 2017-09-29

## 2017-09-29 NOTE — TELEPHONE ENCOUNTER
Left detailed message your prescription refill was denied. Please call [phone] for an appointment.   Refill denied with note to pharmacist: Office visit required. Multiple calls to patient not returned. If she calls pharmacy, please inform. Thanks.  Zaid Sanchez RN

## 2017-09-29 NOTE — TELEPHONE ENCOUNTER
September 5, 2017   Aisha Kent PA-C     12:46 PM   Note      rx filled for 2 weeks then no more           Routing refill request to provider for review/approval because:  Jenn given x1 and patient did not follow up, please advise    Kary Brandt RN, BS  Clinical Nurse Triage.

## 2017-09-29 NOTE — TELEPHONE ENCOUNTER
Then no I cannot prescribe the medicine any more.  Helen Villegas MD  [facility]  [phone]

## 2017-10-06 NOTE — TELEPHONE ENCOUNTER
Patient calling and wants to know what hold up is on her refills.  Advised needs visit.  States does not know why she needs to come in.  Advised we do need to see once a year for med refills.  Asked says who.  Advised [place] policy and offered to transfer to clinic manager to discuss.  Said she will just go without and hung-up.  Amarilys Garzon RN

## 2017-12-23 DIAGNOSIS — E53.8 B12 DEFICIENCY: ICD-10-CM

## 2017-12-26 RX ORDER — CYANOCOBALAMIN 1000 UG/ML
INJECTION, SOLUTION INTRAMUSCULAR; SUBCUTANEOUS
Qty: 1 ML | Refills: 0 | OUTPATIENT
Start: 2017-12-26

## 2017-12-26 NOTE — TELEPHONE ENCOUNTER
Please call pt. Rx denied for B12.  Had lab drawn almost 2 years ago.  Also due for BP f/u and albs.     Please call.

## 2017-12-26 NOTE — TELEPHONE ENCOUNTER
Requested Prescriptions   Pending Prescriptions Disp Refills     cyanocobalamin (VITAMIN B12) 1000 MCG/ML injection [Pharmacy Med Name: CYANOCOBALAMIN 1000MCG/ML INJ, 1ML] 1 mL 0    Last Written Prescription Date:  12/9/19  Last Fill Quantity: 1mL   ,  # refills: 12   Last Office Visit with [facility] or [facility] prescribing provider:  1/11/2017   Future Office Visit:      Sig: INJECT 1 ML IN THE MUSCLE EVERY 30 DAYS    Vitamin Supplements (Adult) Protocol Failed    12/23/2017 12:07 PM       Failed - Recent or future visit with authorizing provider's specialty    Patient had office visit in the last year or has a visit in the next 30 days with authorizing provider.  See chart review.          Passed - High dose Vitamin D not ordered

## 2018-12-27 ENCOUNTER — APPOINTMENT (OUTPATIENT)
Dept: GENERAL RADIOLOGY | Facility: CLINIC | Age: 58
End: 2018-12-27
Attending: EMERGENCY MEDICINE
Payer: COMMERCIAL

## 2018-12-27 ENCOUNTER — HOSPITAL ENCOUNTER (EMERGENCY)
Facility: CLINIC | Age: 58
Discharge: HOME | End: 2018-12-27
Attending: EMERGENCY MEDICINE | Admitting: EMERGENCY MEDICINE
Payer: COMMERCIAL

## 2018-12-27 VITALS
SYSTOLIC BLOOD PRESSURE: 159 MMHG | OXYGEN SATURATION: 94 % | WEIGHT: 145 LBS | TEMPERATURE: 97.7 F | RESPIRATION RATE: 13 BRPM | HEART RATE: 80 BPM | BODY MASS INDEX: 20.76 KG/M2 | DIASTOLIC BLOOD PRESSURE: 107 MMHG | HEIGHT: 70 IN

## 2018-12-27 DIAGNOSIS — R79.89 ELEVATED LFTS: ICD-10-CM

## 2018-12-27 DIAGNOSIS — R07.89 ATYPICAL CHEST PAIN: ICD-10-CM

## 2018-12-27 DIAGNOSIS — I10 ESSENTIAL HYPERTENSION, BENIGN: ICD-10-CM

## 2018-12-27 LAB
ALBUMIN SERPL-MCNC: 4.4 G/DL (ref 3.4–5)
ALBUMIN UR-MCNC: 10 MG/DL
ALP SERPL-CCNC: 71 U/L (ref 40–150)
ALT SERPL W P-5'-P-CCNC: 70 U/L (ref 0–50)
ANION GAP SERPL CALCULATED.3IONS-SCNC: 11 MMOL/L (ref 3–14)
APPEARANCE UR: CLEAR
AST SERPL W P-5'-P-CCNC: 65 U/L (ref 0–45)
BASOPHILS # BLD AUTO: 0 10E9/L (ref 0–0.2)
BASOPHILS NFR BLD AUTO: 0.7 %
BILIRUB SERPL-MCNC: 0.5 MG/DL (ref 0.2–1.3)
BILIRUB UR QL STRIP: NEGATIVE
BUN SERPL-MCNC: 10 MG/DL (ref 7–30)
CALCIUM SERPL-MCNC: 9.5 MG/DL (ref 8.5–10.1)
CHLORIDE SERPL-SCNC: 103 MMOL/L (ref 94–109)
CO2 SERPL-SCNC: 23 MMOL/L (ref 20–32)
COLOR UR AUTO: YELLOW
CREAT SERPL-MCNC: 0.52 MG/DL (ref 0.52–1.04)
D DIMER PPP FEU-MCNC: 0.3 UG/ML FEU (ref 0–0.5)
DIFFERENTIAL METHOD BLD: NORMAL
EOSINOPHIL # BLD AUTO: 0.1 10E9/L (ref 0–0.7)
EOSINOPHIL NFR BLD AUTO: 3.1 %
ERYTHROCYTE [DISTWIDTH] IN BLOOD BY AUTOMATED COUNT: 12.9 % (ref 10–15)
GFR SERPL CREATININE-BSD FRML MDRD: >90 ML/MIN/{1.73_M2}
GLUCOSE SERPL-MCNC: 110 MG/DL (ref 70–99)
GLUCOSE UR STRIP-MCNC: NEGATIVE MG/DL
HCT VFR BLD AUTO: 38.2 % (ref 35–47)
HGB BLD-MCNC: 13.6 G/DL (ref 11.7–15.7)
HGB UR QL STRIP: NEGATIVE
HYALINE CASTS #/AREA URNS LPF: 1 /LPF (ref 0–2)
IMM GRANULOCYTES # BLD: 0 10E9/L (ref 0–0.4)
IMM GRANULOCYTES NFR BLD: 0.4 %
INTERPRETATION ECG - MUSE: NORMAL
KETONES UR STRIP-MCNC: 40 MG/DL
LEUKOCYTE ESTERASE UR QL STRIP: NEGATIVE
LIPASE SERPL-CCNC: 168 U/L (ref 73–393)
LYMPHOCYTES # BLD AUTO: 1.1 10E9/L (ref 0.8–5.3)
LYMPHOCYTES NFR BLD AUTO: 23.4 %
MCH RBC QN AUTO: 32.4 PG (ref 26.5–33)
MCHC RBC AUTO-ENTMCNC: 35.6 G/DL (ref 31.5–36.5)
MCV RBC AUTO: 91 FL (ref 78–100)
MONOCYTES # BLD AUTO: 0.3 10E9/L (ref 0–1.3)
MONOCYTES NFR BLD AUTO: 6.3 %
MUCOUS THREADS #/AREA URNS LPF: PRESENT /LPF
NEUTROPHILS # BLD AUTO: 3 10E9/L (ref 1.6–8.3)
NEUTROPHILS NFR BLD AUTO: 66.1 %
NITRATE UR QL: NEGATIVE
NRBC # BLD AUTO: 0 10*3/UL
NRBC BLD AUTO-RTO: 0 /100
PH UR STRIP: 8.5 PH (ref 5–7)
PLATELET # BLD AUTO: 189 10E9/L (ref 150–450)
POTASSIUM SERPL-SCNC: 4.1 MMOL/L (ref 3.4–5.3)
PROT SERPL-MCNC: 7.8 G/DL (ref 6.8–8.8)
RBC # BLD AUTO: 4.2 10E12/L (ref 3.8–5.2)
RBC #/AREA URNS AUTO: <1 /HPF (ref 0–2)
SODIUM SERPL-SCNC: 137 MMOL/L (ref 133–144)
SOURCE: ABNORMAL
SP GR UR STRIP: 1.01 (ref 1–1.03)
SQUAMOUS #/AREA URNS AUTO: <1 /HPF (ref 0–1)
TROPONIN I SERPL-MCNC: <0.015 UG/L (ref 0–0.04)
TROPONIN I SERPL-MCNC: <0.015 UG/L (ref 0–0.04)
TSH SERPL DL<=0.005 MIU/L-ACNC: 1.39 MU/L (ref 0.4–4)
UROBILINOGEN UR STRIP-MCNC: NORMAL MG/DL (ref 0–2)
WBC # BLD AUTO: 4.6 10E9/L (ref 4–11)
WBC #/AREA URNS AUTO: 1 /HPF (ref 0–5)

## 2018-12-27 PROCEDURE — 25000128 H RX IP 250 OP 636: Performed by: EMERGENCY MEDICINE

## 2018-12-27 PROCEDURE — 85379 FIBRIN DEGRADATION QUANT: CPT | Performed by: EMERGENCY MEDICINE

## 2018-12-27 PROCEDURE — 96361 HYDRATE IV INFUSION ADD-ON: CPT

## 2018-12-27 PROCEDURE — 83690 ASSAY OF LIPASE: CPT | Performed by: EMERGENCY MEDICINE

## 2018-12-27 PROCEDURE — 84443 ASSAY THYROID STIM HORMONE: CPT | Performed by: EMERGENCY MEDICINE

## 2018-12-27 PROCEDURE — 71046 X-RAY EXAM CHEST 2 VIEWS: CPT

## 2018-12-27 PROCEDURE — 85025 COMPLETE CBC W/AUTO DIFF WBC: CPT | Performed by: EMERGENCY MEDICINE

## 2018-12-27 PROCEDURE — 81001 URINALYSIS AUTO W/SCOPE: CPT | Performed by: EMERGENCY MEDICINE

## 2018-12-27 PROCEDURE — 80053 COMPREHEN METABOLIC PANEL: CPT | Performed by: EMERGENCY MEDICINE

## 2018-12-27 PROCEDURE — 99285 EMERGENCY DEPT VISIT HI MDM: CPT | Mod: 25

## 2018-12-27 PROCEDURE — 96374 THER/PROPH/DIAG INJ IV PUSH: CPT

## 2018-12-27 PROCEDURE — 93005 ELECTROCARDIOGRAM TRACING: CPT

## 2018-12-27 PROCEDURE — 84484 ASSAY OF TROPONIN QUANT: CPT | Performed by: EMERGENCY MEDICINE

## 2018-12-27 RX ORDER — PANTOPRAZOLE SODIUM 40 MG/1
40 TABLET, DELAYED RELEASE ORAL DAILY
Qty: 30 TABLET | Refills: 0 | Status: SHIPPED | OUTPATIENT
Start: 2018-12-27 | End: 2019-01-26

## 2018-12-27 RX ORDER — ONDANSETRON 2 MG/ML
4 INJECTION INTRAMUSCULAR; INTRAVENOUS EVERY 30 MIN PRN
Status: DISCONTINUED | OUTPATIENT
Start: 2018-12-27 | End: 2018-12-27 | Stop reason: HOSPADM

## 2018-12-27 RX ORDER — SODIUM CHLORIDE 9 MG/ML
1000 INJECTION, SOLUTION INTRAVENOUS CONTINUOUS
Status: DISCONTINUED | OUTPATIENT
Start: 2018-12-27 | End: 2018-12-27 | Stop reason: HOSPADM

## 2018-12-27 RX ADMIN — SODIUM CHLORIDE 1000 ML: 9 INJECTION, SOLUTION INTRAVENOUS at 09:53

## 2018-12-27 RX ADMIN — ONDANSETRON 4 MG: 2 INJECTION INTRAMUSCULAR; INTRAVENOUS at 09:53

## 2018-12-27 ASSESSMENT — ENCOUNTER SYMPTOMS
CONSTIPATION: 0
DYSURIA: 0
CHILLS: 1
FEVER: 0
COUGH: 0
FREQUENCY: 0
VOMITING: 0
DIARRHEA: 0
DIAPHORESIS: 1
HEADACHES: 0
SHORTNESS OF BREATH: 1
NAUSEA: 1
DIFFICULTY URINATING: 0

## 2018-12-27 ASSESSMENT — MIFFLIN-ST. JEOR: SCORE: 1312.97

## 2018-12-27 NOTE — ED NOTES
Bed: ED11  Expected date: 12/27/18  Expected time: 8:54 AM  Means of arrival: Ambulance  Comments:  [age]f sob tremors  ETA [id number]

## 2018-12-27 NOTE — ED AVS SNAPSHOT
Emergency Department  [address] [place] [address]  Phone:  [phone]  Fax:  [phone]                                    Yasmeen Muñiz   MRN: [id number]    Department:   Emergency Department   Date of Visit:  12/27/2018           After Visit Summary Signature Page    I have received my discharge instructions, and my questions have been answered. I have discussed any challenges I see with this plan with the nurse or doctor.    ..........................................................................................................................................  Patient/Patient Representative Signature      ..........................................................................................................................................  Patient Representative Print Name and Relationship to Patient    ..................................................               ................................................  Date                                   Time    ..........................................................................................................................................  Reviewed by Signature/Title    ...................................................              ..............................................  Date                                               Time          22EPIC Rev 08/18

## 2018-12-27 NOTE — ED PROVIDER NOTES
History     Chief Complaint:  Shortness of Breath    The history is provided by the patient.      Yasmeen Muñiz is a 58 year old female with a history of asthma, HTN, right sided pneumothorax who presents to the emergency department for evaluation of shortness of breath and tremors. Starting two days ago, the patient states she started to be nauseated and was not able to eat at the [email or web address] party. She then had the onset of shortness of breath even with just to talking that reminded her of her asthma. She tried her inhaler which did not help. The continuation of the shortness of breath prompted her to seek evaluation here in the emergency department.    Here, the patient continues to complain of shortness of breath with talking as well as being nauseated. She has some mild chest pain. She also has chills and hot flashes and states she is tremulous as well but no fevers. She is without leg swelling, back pain, abdominal pain, diarrhea, urinary symptoms, headaches, cough, and rashes.  The patient is a habitual alcohol drinker, with her most recent consumption being a glass of wine yesterday. She states she recently quit smoking and is still using her E cigarette periodically.    Cardiac/PE/DVT Risk Factors:  The patient has a history of hypertension, hyperlipidemia, and smoking. She reports a family history of CAD, hyperlipidemia, and HTN. The patient denies any personal or familial history of PE, DVT, or clotting disorder. The patient denies recent travel, surgery, or other immobilizations.       Allergies:  NKDA     Medications:    Albuterol  Zyrtec  Lexapro  Lisinopril    Past Medical History:    Dysthymia  Hyperlipidemia  MARLON  HTN  Asthma  Anemia  B12 deficiency  Allergic rhinitis  Elevated LFT's  Vitamin D Deficiency    Past Surgical History:    D and C   Tonsillectomy  Surgical repair of right orbit floor plate  Removal of right orbit eye muscles    Family History:    Hyperlipidemia  Thyroid  "disease  Pernicious anemia  Alzheimer's disease  CAD: Father  Prostate cancer  Bipolar disorder  Leukemia  Uterine Cancer  Cerebrovascular disease    Social History:  Current smoker: 0.50 ppd  Negative for alcohol use.  Negative for drug use.  Patient resides at home alone.  Marital Status:        Review of Systems   Constitutional: Positive for chills and diaphoresis. Negative for fever.   Respiratory: Positive for shortness of breath. Negative for cough.    Cardiovascular: Positive for chest pain. Negative for leg swelling.   Gastrointestinal: Positive for nausea. Negative for constipation, diarrhea and vomiting.   Genitourinary: Negative for difficulty urinating, dysuria, frequency and urgency.   Skin: Negative for rash.   Neurological: Negative for headaches.   All other systems reviewed and are negative.    Physical Exam     Patient Vitals for the past 24 hrs:   BP Temp Temp src Pulse Heart Rate Resp SpO2 Height Weight   12/27/18 1245 141/89 -- -- 85 82 15 -- -- --   12/27/18 1200 137/81 -- -- 85 84 10 -- -- --   12/27/18 1145 149/81 -- -- 86 92 24 -- -- --   12/27/18 1100 -- -- -- -- 82 14 94 % -- --   12/27/18 1036 141/89 -- -- 80 -- -- -- -- --   12/27/18 1000 144/83 -- -- 75 78 24 97 % -- --   12/27/18 0945 (!) 141/97 -- -- 72 71 11 97 % -- --   12/27/18 0918 (!) 151/106 97.7  F (36.5  C) Oral 75 -- 20 -- 1.778 m (5' 10\") 65.8 kg (145 lb)   spO2 97% RA    Physical Exam  Constitutional: white female sitting, no respirator distress  HENT: No signs of trauma.   Eyes: EOM are normal. Pupils are equal, round, and reactive to light.   Neck: Normal range of motion. No JVD present. No cervical adenopathy. Anterior fullness of the neck suggestive of hypothyroid.   Cardiovascular: Regular rhythm.  Exam reveals no gallop and no friction rub. 2+ radial and femoral pulses.     No murmur heard.  Pulmonary/Chest: Bilateral breath sounds normal. No wheezes, rhonchi or rales.  Abdominal: Soft. No tenderness. No " rebound or guarding.   Musculoskeletal: No edema. No tenderness.   Lymphadenopathy: No lymphadenopathy.   Neurological: Alert and oriented to person, place, and time. Normal strength. Coordination normal.   Skin: Skin is warm and dry. No rash noted. No erythema.   Emergency Department Course   ECG:  Indication: shortness of breath  Time: 0917  Vent. Rate 72 bpm. LA interval 150. QRS duration 78. QT/QTc 416/455. P-R-T axis 65 57 59. Normal sinus rhythm. Nonspecific ST changes. Read time: 0928.    Imaging:  Radiographic findings were communicated with the patient who voiced understanding of the findings.    XR Chest 2 views:   No evidence of active cardiopulmonary disease. As per radiology.     Laboratory:  D-Dimer: 0.3  0920 Troponin: <0.015  1210 Repeat Troponin: <0.015  CMP: Glucose 110, ALT 70, AST 65, o/w WNL (Creatinine: 0.52)  CBC: WBC: 4.6, HGB: 13.6, PLT: 189  TSH with free T4 Reflex: 1.39  Lipase: 168    UA with micro: Ketones, pH 8.5, protein albumin 10, mucous present o/w negative  Urine Culture: In process    Interventions:  0953 Zofran, 4 mg, IV   0953 NS 1L IV    Emergency Department Course:  0930 Nursing notes and vitals reviewed.  I performed an exam of the patient as documented above.     IV inserted. Medicine administered as documented above. Blood drawn. This was sent to the lab for further testing, results above.    EKG obtained in the ED, see results above.     The patient provided a urine sample here in the emergency department. This was sent for laboratory testing, findings above.     The patient was sent for a chest xray while in the emergency department, findings above.     1302 I rechecked the patient and discussed the results of her workup thus far.     Findings and plan explained to the Patient. Patient discharged home with instructions regarding supportive care, medications, and reasons to return. The importance of close follow-up was reviewed. The patient was prescribed Protonix.     I  personally reviewed the laboratory results with the Patient and answered all related questions prior to discharge.   Impression & Plan    Medical Decision Making:  Yasmeen Muñiz is a 58 year old female who presents to the ED with shortness of breath and chest discomfort. She is not sure why this began at work today and why its persisted. She does have asthma and uses a rescue inhaler at times. She does smoke and has high pressures. She does admit to drinking some alcohol, though she states just one glass yesterday. On exam, she is somewhat anxious, her lungs are clear, she has normal heart tones, and relatively non tender abdomen. Workup includes chest xray, EKG, troponin's x 2, and d dimer, which are all unremarkable. CBC and Chemistry are remarkable for some elevated liver functions. Patient states she does drink a little more than she admits. She also notes more of a burning sensation when I asked her again. I think her chest pain is atypical; if anything, I think it is probably related to reflux. I think it is unlikely her shortness of breath is related to acute coronary syndrome, PE, or dissection. I will start her on Protonix and set her up for an outpatient stress test. I have warned her about excessive drinking. Patient should make a follow up with her PMD after stress test. She should return to the ED for increase shortness of breath, chest pain, or difficulty breathing.     Diagnosis:    ICD-10-CM    1. Essential hypertension, benign I10 Echo Stress Echocardiogram   2. Elevated LFTs R94.5 Echo Stress Echocardiogram   3. Atypical chest pain R07.89        Disposition:  discharged to home    Discharge Medications:     Medication List      Started    pantoprazole 40 MG EC tablet  Commonly known as:  PROTONIX  40 mg, Oral, DAILY          Scribe Disclosure:  I, Sofía Schwartz, am serving as a scribe on 12/27/2018 at 9:23 AM to personally document services performed by Elton Middleton MD based on my  observations and the provider's statements to me.     Sofía Schwartz  12/27/2018    EMERGENCY DEPARTMENT       Elton Middleton MD  12/27/18 3607

## 2019-11-05 ENCOUNTER — HEALTH MAINTENANCE LETTER (OUTPATIENT)
Age: 59
End: 2019-11-05

## 2020-01-06 ENCOUNTER — TRANSFERRED RECORDS (OUTPATIENT)
Dept: HEALTH INFORMATION MANAGEMENT | Facility: CLINIC | Age: 60
End: 2020-01-06

## 2020-11-22 ENCOUNTER — HEALTH MAINTENANCE LETTER (OUTPATIENT)
Age: 60
End: 2020-11-22

## 2021-09-19 ENCOUNTER — HEALTH MAINTENANCE LETTER (OUTPATIENT)
Age: 61
End: 2021-09-19

## 2021-11-14 ENCOUNTER — HEALTH MAINTENANCE LETTER (OUTPATIENT)
Age: 61
End: 2021-11-14

## 2022-01-09 ENCOUNTER — HEALTH MAINTENANCE LETTER (OUTPATIENT)
Age: 62
End: 2022-01-09

## 2022-11-20 ENCOUNTER — HEALTH MAINTENANCE LETTER (OUTPATIENT)
Age: 62
End: 2022-11-20

## 2023-04-15 ENCOUNTER — HEALTH MAINTENANCE LETTER (OUTPATIENT)
Age: 63
End: 2023-04-15

## 2023-11-25 ENCOUNTER — HEALTH MAINTENANCE LETTER (OUTPATIENT)
Age: 63
End: 2023-11-25